# Patient Record
Sex: FEMALE | Race: WHITE | Employment: STUDENT | URBAN - METROPOLITAN AREA
[De-identification: names, ages, dates, MRNs, and addresses within clinical notes are randomized per-mention and may not be internally consistent; named-entity substitution may affect disease eponyms.]

---

## 2018-07-30 PROCEDURE — 87086 URINE CULTURE/COLONY COUNT: CPT | Performed by: OBSTETRICS & GYNECOLOGY

## 2018-08-03 ENCOUNTER — HOSPITAL ENCOUNTER (OUTPATIENT)
Dept: ULTRASOUND IMAGING | Age: 18
Discharge: HOME | End: 2018-08-03
Attending: OBSTETRICS & GYNECOLOGY
Payer: COMMERCIAL

## 2018-08-03 DIAGNOSIS — N12 RECURRENT PYELONEPHRITIS: ICD-10-CM

## 2018-08-03 DIAGNOSIS — Z87.448 HISTORY OF VESICOURETERAL REFLUX: ICD-10-CM

## 2018-08-03 PROCEDURE — 76770 US EXAM ABDO BACK WALL COMP: CPT | Performed by: OBSTETRICS & GYNECOLOGY

## 2018-08-16 PROBLEM — N13.70 BILATERAL URETERAL REFLUX: Status: ACTIVE | Noted: 2018-08-16

## 2019-01-03 PROCEDURE — 81001 URINALYSIS AUTO W/SCOPE: CPT | Performed by: OBSTETRICS & GYNECOLOGY

## 2019-01-04 ENCOUNTER — ANESTHESIA EVENT (OUTPATIENT)
Dept: SURGERY | Facility: HOSPITAL | Age: 19
End: 2019-01-04
Payer: COMMERCIAL

## 2019-01-04 ENCOUNTER — HOSPITAL ENCOUNTER (OUTPATIENT)
Facility: HOSPITAL | Age: 19
Setting detail: HOSPITAL OUTPATIENT SURGERY
Discharge: HOME | End: 2019-01-04
Attending: UROLOGY | Admitting: UROLOGY
Payer: COMMERCIAL

## 2019-01-04 ENCOUNTER — ANESTHESIA (OUTPATIENT)
Dept: SURGERY | Facility: HOSPITAL | Age: 19
End: 2019-01-04
Payer: COMMERCIAL

## 2019-01-04 VITALS
TEMPERATURE: 99 F | RESPIRATION RATE: 16 BRPM | SYSTOLIC BLOOD PRESSURE: 120 MMHG | HEART RATE: 74 BPM | DIASTOLIC BLOOD PRESSURE: 74 MMHG | HEIGHT: 63 IN | BODY MASS INDEX: 20.2 KG/M2 | OXYGEN SATURATION: 100 % | WEIGHT: 114 LBS

## 2019-01-04 DIAGNOSIS — N12 PYELONEPHRITIS: ICD-10-CM

## 2019-01-04 DIAGNOSIS — N13.70 VESICOURETERAL REFLUX: ICD-10-CM

## 2019-01-04 DIAGNOSIS — Z87.448 HISTORY OF VESICOURETERAL REFLUX: ICD-10-CM

## 2019-01-04 LAB
POCT LOT NUMBER: NORMAL
POCT URINE PREGNANCY: NEGATIVE
PROCEDURE CONTROL: YES

## 2019-01-04 PROCEDURE — 0TV78ZZ RESTRICTION OF LEFT URETER, VIA NATURAL OR ARTIFICIAL OPENING ENDOSCOPIC: ICD-10-PCS | Performed by: UROLOGY

## 2019-01-04 PROCEDURE — 0TV68ZZ RESTRICTION OF RIGHT URETER, VIA NATURAL OR ARTIFICIAL OPENING ENDOSCOPIC: ICD-10-PCS | Performed by: UROLOGY

## 2019-01-04 PROCEDURE — 81025 URINE PREGNANCY TEST: CPT | Performed by: UROLOGY

## 2019-01-04 DEVICE — DEFLUX GEL 1ML: Type: IMPLANTABLE DEVICE | Site: BLADDER | Status: FUNCTIONAL

## 2019-01-04 RX ORDER — LIDOCAINE HYDROCHLORIDE 10 MG/ML
INJECTION, SOLUTION INFILTRATION; PERINEURAL AS NEEDED
Status: DISCONTINUED | OUTPATIENT
Start: 2019-01-04 | End: 2019-01-04 | Stop reason: HOSPADM

## 2019-01-04 RX ORDER — DIPHENHYDRAMINE HYDROCHLORIDE 50 MG/ML
12.5 INJECTION INTRAMUSCULAR; INTRAVENOUS AS NEEDED
Status: DISCONTINUED | OUTPATIENT
Start: 2019-01-04 | End: 2019-01-04

## 2019-01-04 RX ORDER — ONDANSETRON 2 MG/ML
4 INJECTION INTRAMUSCULAR; INTRAVENOUS AS NEEDED
Status: DISCONTINUED | OUTPATIENT
Start: 2019-01-04 | End: 2019-01-04

## 2019-01-04 RX ORDER — MEPERIDINE HYDROCHLORIDE 25 MG/ML
12.5 INJECTION INTRAMUSCULAR; INTRAVENOUS; SUBCUTANEOUS AS NEEDED
Status: DISCONTINUED | OUTPATIENT
Start: 2019-01-04 | End: 2019-01-04

## 2019-01-04 RX ORDER — CEFAZOLIN SODIUM/WATER 2 G/20 ML
2 SYRINGE (ML) INTRAVENOUS ONCE
Status: COMPLETED | OUTPATIENT
Start: 2019-01-04 | End: 2019-01-04

## 2019-01-04 RX ORDER — SODIUM CHLORIDE, SODIUM LACTATE, POTASSIUM CHLORIDE, CALCIUM CHLORIDE 600; 310; 30; 20 MG/100ML; MG/100ML; MG/100ML; MG/100ML
INJECTION, SOLUTION INTRAVENOUS CONTINUOUS
Status: DISCONTINUED | OUTPATIENT
Start: 2019-01-04 | End: 2019-01-04

## 2019-01-04 RX ORDER — HYDROCODONE BITARTRATE AND ACETAMINOPHEN 5; 325 MG/1; MG/1
2 TABLET ORAL AS NEEDED
Status: DISCONTINUED | OUTPATIENT
Start: 2019-01-04 | End: 2019-01-04

## 2019-01-04 RX ORDER — HYDROCODONE BITARTRATE AND ACETAMINOPHEN 5; 325 MG/1; MG/1
1 TABLET ORAL AS NEEDED
Status: DISCONTINUED | OUTPATIENT
Start: 2019-01-04 | End: 2019-01-04

## 2019-01-04 RX ORDER — ACETAMINOPHEN 500 MG
1000 TABLET ORAL ONCE AS NEEDED
Status: DISCONTINUED | OUTPATIENT
Start: 2019-01-04 | End: 2019-01-04

## 2019-01-04 RX ORDER — HYDROMORPHONE HYDROCHLORIDE 1 MG/ML
0.4 INJECTION, SOLUTION INTRAMUSCULAR; INTRAVENOUS; SUBCUTANEOUS EVERY 5 MIN PRN
Status: DISCONTINUED | OUTPATIENT
Start: 2019-01-04 | End: 2019-01-04

## 2019-01-04 RX ORDER — MIDAZOLAM HYDROCHLORIDE 1 MG/ML
1 INJECTION INTRAMUSCULAR; INTRAVENOUS EVERY 5 MIN PRN
Status: DISCONTINUED | OUTPATIENT
Start: 2019-01-04 | End: 2019-01-04

## 2019-01-04 RX ORDER — DEXAMETHASONE SODIUM PHOSPHATE 4 MG/ML
4 VIAL (ML) INJECTION AS NEEDED
Status: DISCONTINUED | OUTPATIENT
Start: 2019-01-04 | End: 2019-01-04

## 2019-01-04 RX ORDER — NALOXONE HYDROCHLORIDE 0.4 MG/ML
80 INJECTION, SOLUTION INTRAMUSCULAR; INTRAVENOUS; SUBCUTANEOUS AS NEEDED
Status: DISCONTINUED | OUTPATIENT
Start: 2019-01-04 | End: 2019-01-04

## 2019-01-04 RX ORDER — METOCLOPRAMIDE HYDROCHLORIDE 5 MG/ML
10 INJECTION INTRAMUSCULAR; INTRAVENOUS AS NEEDED
Status: DISCONTINUED | OUTPATIENT
Start: 2019-01-04 | End: 2019-01-04

## 2019-01-04 RX ORDER — IBUPROFEN 600 MG/1
600 TABLET ORAL ONCE AS NEEDED
Status: DISCONTINUED | OUTPATIENT
Start: 2019-01-04 | End: 2019-01-04

## 2019-01-04 RX ORDER — ACETAMINOPHEN 500 MG
1000 TABLET ORAL ONCE
Status: DISCONTINUED | OUTPATIENT
Start: 2019-01-04 | End: 2019-01-04

## 2019-01-04 NOTE — ANESTHESIA PREPROCEDURE EVALUATION
PRE-OP EVALUATION    Patient Name: Prisca Mendoza    Pre-op Diagnosis: Vesicoureteral reflux [N13.70]  Pyelonephritis [N12]  History of vesicoureteral reflux [Z87.448]    Procedure(s):  POSITIONAL INSTILLATION OF CONTRAST CYSTOGRAM, POSSIBLE BILATERAL DEFL history. Pulmonary    Pulmonary exam normal.  Breath sounds clear to auscultation bilaterally. Other findings            ASA: 1   Plan: general  NPO status verified and patient meets guidelines.           Plan/risks discussed with: brayden

## 2019-01-04 NOTE — H&P
History & Physical Examination    Patient Name: Soco Garcia  MRN: [id number]  CSN: [id number]  YOB: 2000    Diagnosis: RECURRING PYELONEPHRITIS, B. REFLUX    Present Illness: RECURRING PYELONEPHRITIS, B. REFLUX      No medications prior to

## 2019-01-05 NOTE — ANESTHESIA POSTPROCEDURE EVALUATION
[address] Patient Status:  Hospital Outpatient Surgery   Age/Gender 19year old female MRN [id number]   Location [address] [facility] Attending Charles Sharif MD   Hosp Day # 0 PCP MESHA Hunt

## 2019-01-05 NOTE — BRIEF OP NOTE
Pre-Operative Diagnosis: Vesicoureteral reflux [N13.70]  Pyelonephritis [N12]  History of vesicoureteral reflux [Z87.448]     Post-Operative Diagnosis: Vesicoureteral reflux [N13.70]Pyelonephritis [N12]History of vesicoureteral reflux [Z87.448]      Proced

## 2019-01-05 NOTE — OPERATIVE REPORT
[facility]    PATIENT'S NAME: Chinedu Olson   ATTENDING PHYSICIAN: Arpan Conley M.D. OPERATING PHYSICIAN: Arpan Conley M.D.    PATIENT ACCOUNT#:   [de-identified]    [facility]  MEDICAL RECORD #:   [id number]       DATE OF aspect of the ureter. This was performed in standard fashion on the right side with the implant within the ureter and then inferolateral on the right side as well.   This allowed for excellent coaptation and elevation of the ureteral orifice with a nice re

## 2021-07-22 NOTE — BRIEF OP NOTE
Pre-Operative Diagnosis: Vesicoureteral reflux [N13.70]  Pyelonephritis [N12]  History of vesicoureteral reflux [Z87.448]     Post-Operative Diagnosis:Vesicoureteral reflux [N13.70]  Pyelonephritis [N12]  History of vesicoureteral reflux [Z87.448]     Proc Cimzia Counseling:  I discussed with the patient the risks of Cimzia including but not limited to immunosuppression, allergic reactions and infections.  The patient understands that monitoring is required including a PPD at baseline and must alert us or the primary physician if symptoms of infection or other concerning signs are noted.

## (undated) DEVICE — REDUCER FITTING (NON-STERILE) 7/8 IN (22 MM) TO 1/4 IN (6.4 MM): Brand: CONMED BUFFALO FILTER

## (undated) DEVICE — GAMMEX® PI HYBRID SIZE 7, STERILE POWDER-FREE SURGICAL GLOVE, POLYISOPRENE AND NEOPRENE BLEND: Brand: GAMMEX

## (undated) DEVICE — SYRINGE 10ML LL TIP

## (undated) DEVICE — NEEDLE DEFLUX 3.7FX23GX350MM: Type: IMPLANTABLE DEVICE | Site: BLADDER

## (undated) DEVICE — KENDALL SCD EXPRESS SLEEVES, KNEE LENGTH, MEDIUM: Brand: KENDALL SCD

## (undated) DEVICE — SYRINGE 20CC LL TIP

## (undated) DEVICE — PAD SACRAL SPAN AID

## (undated) DEVICE — MEDI-VAC NON-CONDUCTIVE SUCTION TUBING: Brand: CARDINAL HEALTH

## (undated) DEVICE — Device

## (undated) DEVICE — CYSTO CDS-LF: Brand: MEDLINE INDUSTRIES, INC.

## (undated) DEVICE — NEEDLE DEFLUX 3.7FX23GX350MM

## (undated) NOTE — LETTER
[address] Removal Waiver:    I hereby acknowledge that [facility] staff attempted the following interventions to remove my jewelry:     q  Patient/Staff unable to remove jewelry    q  Ice extremity    q  Use soap & water    q  Use lotion _________________________________________________________             Noemi Apgar   :  2000  MRN:  [id number]  CSN:  [id number]  Lorne Benavides Md  [facility] PERIOPERATIVE SVC

## (undated) NOTE — LETTER
[address] Removal Waiver:    I hereby acknowledge that [facility] staff attempted the following interventions to remove my jewelry:     q  Elevate extremity    q  Ice extremity    q  Use soap & water    q  Use lotions or lubricant    q Nancy Ramirez   :  2000  MRN:  [id number]  CSN:  [id number]  Jacqueline Benoit Md  [facility] PERIOPERATIVE SVC